# Patient Record
Sex: MALE | Race: OTHER | URBAN - METROPOLITAN AREA
[De-identification: names, ages, dates, MRNs, and addresses within clinical notes are randomized per-mention and may not be internally consistent; named-entity substitution may affect disease eponyms.]

---

## 2019-03-29 NOTE — PATIENT DISCUSSION
This visual field clearly demonstrated a minimum of 50% loss of upper field of vision OU, with upper lid skin in repose and elevated by taping of the lid to demonstrate potential correction. This field shows that taping the lids significantly improved this patient's superior field of vision by approximately 48%, OU.

## 2019-05-21 NOTE — PROCEDURE NOTE: SURGICAL
<b>MR #:</b>&nbsp; [id number]<br />  <br />  <b>PREOPERATIVE DIAGNOSIS: &nbsp; &nbsp; </b>1. Dermatochalasis upper lids; 2. Lower lid fat herniation and excess skin<br />  <br />  <b>POSTOPERATIVE DIAGNOSIS: &nbsp; </b>Same <br />  <br />  <b>PROCEDURE: &nbsp; </b>1. Upper lid blepharoplasty both eyes; 2. C02 Laser transconjunctival blepharoplasty lower lids; 3. Skin resurfacing of lower lids<br />  <br />  <b>ANESTHESIA: &nbsp; &nbsp; </b>Local MAC<br />  <br />  <b>ESTIMATED BLOOD LOSS: &nbsp; </b>&nbsp;Minimal, &lt;5 cc <br />  <br />  <b>COMPLICATIONS: &nbsp; </b>&nbsp;None<br />  <br />  <b>INDICATION: &nbsp;</b>This patient had complaints of heavy skin and muscle of the upper eyelids that comes down over the eyelids and affects vision. &nbsp; Examination complete with a photograph of the patient confirmed extra upper lid skin that hangs over the eye and blocks vision. Superior visual field defects were also documented on Walter visual field with marked improvement in the superior scotomas after taping the lids up. We have discussed that a standard blepharoplasty operation would remove this extra tissue from the upper lids and allow an improvement in their abnormal visual field. Patient understands the risks and benefits, including the risk of ectropion and scleral show and wishes to proceed. This patient also has large herniated fat on the lower lids and redundant skin of the lower lids. We decided to perform a lower blepharoplasty operation to remove lower lid fat bags and to lightly resurface the skin to tighten them up. Patient understands the risks and benefits of the surgery and wishes to proceed. <br />  <br />  <b>PROCEDURE: &nbsp;</b>The patient was brought to the operating room and laid in the supine position. Prior to surgery, a time-out was performed in the operating room, and the nurse confirmed the patient’s identity and name, the side and site of the surgery and the type of surgery and procedure to be performed. &nbsp; I proceeded with the marking of the patient with a marking pen, in the areas of surgery to be performed. The patient had upper lid crease markings drawn out with a marking pen. The skin of the upper lids was then tentatively bunched with two forceps to determine the amount of skin to be removed so the patient could still close their eye and not have lagophthalmos. The superior portion of the blepharoplasty incision line was then marked in an eclipse with a marking pen. The patient was then sedated and injected with Xylocaine 2% with epinephrine. &nbsp; Approximately 6cc’s were used for both lids. &nbsp; A drop of Alcaine was placed over both eyes. The patient was then prepped and draped in typical fashion for facial plastic surgery. Laser safe protective corneal lenses were then placed over both eyes. Two forceps were then used to recheck the upper lid measurements and the patient was remarked with a blue marking pen in a spindle shape fashion on each upper lid. A 15 blade was first used to make an incision through each pre-marked area on each upper lid. &nbsp; A Bovie cautery on cutting mode with a [place] tip was then used to remove a skin muscle flap on each upper lid. &nbsp; The orbital septum was opened and the orbital fat selectively removed with the Bovie Cautery from each upper lid. Meticulous hemostasis was achieved. After cauterizing all bleeders, each wound was then closed with running 6-0 plain sutures. &nbsp; The patient was inspected for contour and shape. An excellent appearance was noted. <br />  <br />  Attention was turned to the lower blepharoplasty operation where a rake was then inserted into the right lower lid, retracting it downward. A transconjunctival incision was made 2mm below the tarsus with the Bovie cautery. This incision was carried across the extent of the inner lid. Dissection was carried down with the Bovie cautery while pulling upward on the conjunctiva and retractor layer with a forceps. The fat was released from the septum and allowed to herniate forward exposing the nasal, central and temporal fat pads. Care was taken to avoid the inferior oblique muscle. These pads were teased forward and isolated. The fat was removed selectively with a Bovie cautery with excellent hemostasis. &nbsp; After judging adequate fat removal, the left lower lid had similar procedure. The skin was then resurfaced using the resurfacing CO2 laser on a low setting of 16 bynum scan mode for an initial pass on both lower lids. This was done to tighten up the skin to remove wrinkles. This patient did have lid laxity so the laser was not aggressively performed on this patient in order to avoid ectropion. A second pass on the malar area was performed with the C02 laser set again at 16 bynum. This was done to tighten up the malar area so fluid bags would not collect on the cheeks of this patient. At the end of this procedure the laser lens were removed. The patient was cleaned; antibiotic salve was placed on the wounds. The patient was sent to recovery room in stable condition. <br />

## 2020-07-23 NOTE — PATIENT DISCUSSION
Recommend Mini lower lift, post-tragel, SMAS to cheeks/chin(discussed risks and benefits of sx. ..).  Laxity will fall within 1 year, may need secondary lift within 2 years.

## 2022-02-22 ENCOUNTER — NEW PATIENT (OUTPATIENT)
Dept: URBAN - METROPOLITAN AREA CLINIC 49 | Facility: CLINIC | Age: 62
End: 2022-02-22

## 2022-02-22 DIAGNOSIS — H43.813: ICD-10-CM

## 2022-02-22 DIAGNOSIS — H52.4: ICD-10-CM

## 2022-02-22 DIAGNOSIS — H25.13: ICD-10-CM

## 2022-02-22 DIAGNOSIS — Z01.01: ICD-10-CM

## 2022-02-22 DIAGNOSIS — H40.013: ICD-10-CM

## 2022-02-22 DIAGNOSIS — H04.123: ICD-10-CM

## 2022-02-22 PROCEDURE — 92133 CPTRZD OPH DX IMG PST SGM ON: CPT

## 2022-02-22 PROCEDURE — 92015 DETERMINE REFRACTIVE STATE: CPT

## 2022-02-22 PROCEDURE — 92004 COMPRE OPH EXAM NEW PT 1/>: CPT

## 2022-02-22 ASSESSMENT — VISUAL ACUITY
OS_PH: 20/20-1
OS_GLARE: 20/25
OD_GLARE: 20/30
OS_SC: 20/30-1
OD_SC: 20/30-2
OD_GLARE: 20/25
OD_PH: 20/20-2

## 2022-02-22 ASSESSMENT — KERATOMETRY
OS_AXISANGLE2_DEGREES: 180
OD_AXISANGLE_DEGREES: 33
OS_AXISANGLE_DEGREES: 90
OD_AXISANGLE2_DEGREES: 123
OD_K2POWER_DIOPTERS: 42.75
OS_K1POWER_DIOPTERS: 43.00
OD_K1POWER_DIOPTERS: 43.00
OS_K2POWER_DIOPTERS: 43.00

## 2022-02-22 ASSESSMENT — TONOMETRY
OD_IOP_MMHG: 16
OS_IOP_MMHG: 16

## 2022-02-22 NOTE — PATIENT DISCUSSION
Per increased c/d ratio, OS>OD. IOP wnl, 16/16. (-) family hx. OCT (RNFL) done today, wnl. Will monitor annually.

## 2022-04-05 NOTE — PATIENT DISCUSSION
Recommended warm compresses. Toradol 10 mg tabs approved from 4/1/22 through 4/1/23    14 tabs for 34 days      Called [facility] pharmacy and notified Pharmacist

## 2022-07-01 NOTE — PROCEDURE NOTE: CLINICAL
PROCEDURE NOTE: Laser for Retinal Tear OS. Diagnosis: Horseshoe Tear of Retina Without Detachment. Anesthesia: Topical. Prior to laser, risks/benefits/alternatives to laser discussed including loss of vision, decreased peripheral and night vision, need for more laser and/or surgery and patient wished to proceed. A written consent is on file, and the need for today’s laser was discussed and the patient is understanding and wishes to proceed. Laser Lens: superquad/28D. Wavelength: Argon Green. Spot size: 100 um. Pulse power: 300 mW. Number of pulses: 31. Patient tolerated procedure well. There were no complications. Post-op instructions given. Patient given office phone number/answering service number and advised to call immediately should there be loss of vision or pain, or should they have any other questions or concerns. Kadie Kapoor

## 2022-07-06 NOTE — PROCEDURE NOTE: CLINICAL
PROCEDURE NOTE: Laser for Retinal Tear OS. Diagnosis: Horseshoe Tear of Retina Without Detachment. Anesthesia: Topical. Prior to laser, risks/benefits/alternatives to laser discussed including loss of vision, decreased peripheral and night vision, need for more laser and/or surgery and patient wished to proceed. A written consent is on file, and the need for today’s laser was discussed and the patient is understanding and wishes to proceed. Laser Lens: 28. Wavelength: Argon Green. Spot size: 100 um. Pulse power: 150 mW. Number of pulses: 15. Patient tolerated procedure well. There were no complications. Post-op instructions given. Patient given office phone number/answering service number and advised to call immediately should there be loss of vision or pain, or should they have any other questions or concerns. Iliana Baker

## 2022-09-27 NOTE — PATIENT DISCUSSION
Retinal  detachment warning symptoms reviewed and patient instructed to call immediately if increasing floaters, flashes, or decreasing peripheral vision. Ambulatory

## 2022-09-27 NOTE — PROCEDURE NOTE: CLINICAL
PROCEDURE NOTE: Laser for Retinal Tear OS. Diagnosis: Horseshoe Tear of Retina Without Detachment. Anesthesia: Topical. Prior to laser, risks/benefits/alternatives to laser discussed including loss of vision, decreased peripheral and night vision, need for more laser and/or surgery and patient wished to proceed. A written consent is on file, and the need for today’s laser was discussed and the patient is understanding and wishes to proceed. Laser Lens: 28 and super quad. Wavelength: Argon Green. Spot size: 100 um. Pulse power: 150 mW. Number of pulses: 20. Patient tolerated procedure well. There were no complications. Post-op instructions given. Patient given office phone number/answering service number and advised to call immediately should there be loss of vision or pain, or should they have any other questions or concerns. Jaylin Mayo

## 2022-10-03 NOTE — PATIENT DISCUSSION
Slightly decreased Vit Heme, Scleral Depressed 360, No New RT/RD on todays exam, Retinal detachment warnings given.

## 2024-05-17 NOTE — PATIENT DISCUSSION
Discharge paperwork given and explained thoroughly. Pt denies any further questions or concerns at this time. Pt daughter will transport pt home.    Observe.